# Patient Record
Sex: FEMALE | Race: WHITE
[De-identification: names, ages, dates, MRNs, and addresses within clinical notes are randomized per-mention and may not be internally consistent; named-entity substitution may affect disease eponyms.]

---

## 2021-02-25 ENCOUNTER — HOSPITAL ENCOUNTER (OUTPATIENT)
Dept: HOSPITAL 92 - ERS | Age: 86
Setting detail: OBSERVATION
LOS: 2 days | Discharge: HOME | End: 2021-02-27
Attending: FAMILY MEDICINE | Admitting: FAMILY MEDICINE
Payer: MEDICARE

## 2021-02-25 VITALS — BODY MASS INDEX: 27.3 KG/M2

## 2021-02-25 DIAGNOSIS — I10: ICD-10-CM

## 2021-02-25 DIAGNOSIS — Z88.2: ICD-10-CM

## 2021-02-25 DIAGNOSIS — H35.30: ICD-10-CM

## 2021-02-25 DIAGNOSIS — U07.1: Primary | ICD-10-CM

## 2021-02-25 DIAGNOSIS — F41.9: ICD-10-CM

## 2021-02-25 DIAGNOSIS — Z79.82: ICD-10-CM

## 2021-02-25 DIAGNOSIS — F32.9: ICD-10-CM

## 2021-02-25 DIAGNOSIS — Z86.73: ICD-10-CM

## 2021-02-25 DIAGNOSIS — R07.89: ICD-10-CM

## 2021-02-25 LAB
ALBUMIN SERPL BCG-MCNC: 3.7 G/DL (ref 3.4–4.8)
ALP SERPL-CCNC: 72 U/L (ref 40–110)
ALT SERPL W P-5'-P-CCNC: 17 U/L (ref 8–55)
ANION GAP SERPL CALC-SCNC: 12 MMOL/L (ref 10–20)
AST SERPL-CCNC: 18 U/L (ref 5–34)
BASOPHILS # BLD AUTO: 0.1 THOU/UL (ref 0–0.2)
BASOPHILS NFR BLD AUTO: 2.3 % (ref 0–1)
BILIRUB SERPL-MCNC: 0.2 MG/DL (ref 0.2–1.2)
BUN SERPL-MCNC: 17 MG/DL (ref 9.8–20.1)
CALCIUM SERPL-MCNC: 8.9 MG/DL (ref 7.8–10.44)
CHLORIDE SERPL-SCNC: 105 MMOL/L (ref 98–107)
CO2 SERPL-SCNC: 24 MMOL/L (ref 23–31)
CREAT CL PREDICTED SERPL C-G-VRATE: 0 ML/MIN (ref 70–130)
EOSINOPHIL # BLD AUTO: 0.1 THOU/UL (ref 0–0.7)
EOSINOPHIL NFR BLD AUTO: 2.2 % (ref 0–10)
GLOBULIN SER CALC-MCNC: 3 G/DL (ref 2.4–3.5)
GLUCOSE SERPL-MCNC: 118 MG/DL (ref 83–110)
HGB BLD-MCNC: 15.3 G/DL (ref 12–16)
LYMPHOCYTES # BLD: 1.7 THOU/UL (ref 1.2–3.4)
LYMPHOCYTES NFR BLD AUTO: 38.8 % (ref 21–51)
MCH RBC QN AUTO: 29.9 PG (ref 27–31)
MCV RBC AUTO: 90.1 FL (ref 78–98)
MONOCYTES # BLD AUTO: 0.4 THOU/UL (ref 0.11–0.59)
MONOCYTES NFR BLD AUTO: 9.9 % (ref 0–10)
NEUTROPHILS # BLD AUTO: 2.1 THOU/UL (ref 1.4–6.5)
NEUTROPHILS NFR BLD AUTO: 46.8 % (ref 42–75)
PLATELET # BLD AUTO: 184 THOU/UL (ref 130–400)
POTASSIUM SERPL-SCNC: 3.6 MMOL/L (ref 3.5–5.1)
RBC # BLD AUTO: 5.12 MILL/UL (ref 4.2–5.4)
SODIUM SERPL-SCNC: 137 MMOL/L (ref 136–145)
WBC # BLD AUTO: 4.4 THOU/UL (ref 4.8–10.8)

## 2021-02-25 PROCEDURE — 86900 BLOOD TYPING SEROLOGIC ABO: CPT

## 2021-02-25 PROCEDURE — 96372 THER/PROPH/DIAG INJ SC/IM: CPT

## 2021-02-25 PROCEDURE — 85379 FIBRIN DEGRADATION QUANT: CPT

## 2021-02-25 PROCEDURE — 86140 C-REACTIVE PROTEIN: CPT

## 2021-02-25 PROCEDURE — 86901 BLOOD TYPING SEROLOGIC RH(D): CPT

## 2021-02-25 PROCEDURE — 99285 EMERGENCY DEPT VISIT HI MDM: CPT

## 2021-02-25 PROCEDURE — 80053 COMPREHEN METABOLIC PANEL: CPT

## 2021-02-25 PROCEDURE — 36415 COLL VENOUS BLD VENIPUNCTURE: CPT

## 2021-02-25 PROCEDURE — 96374 THER/PROPH/DIAG INJ IV PUSH: CPT

## 2021-02-25 PROCEDURE — U0003 INFECTIOUS AGENT DETECTION BY NUCLEIC ACID (DNA OR RNA); SEVERE ACUTE RESPIRATORY SYNDROME CORONAVIRUS 2 (SARS-COV-2) (CORONAVIRUS DISEASE [COVID-19]), AMPLIFIED PROBE TECHNIQUE, MAKING USE OF HIGH THROUGHPUT TECHNOLOGIES AS DESCRIBED BY CMS-2020-01-R: HCPCS

## 2021-02-25 PROCEDURE — 85025 COMPLETE CBC W/AUTO DIFF WBC: CPT

## 2021-02-25 PROCEDURE — 71045 X-RAY EXAM CHEST 1 VIEW: CPT

## 2021-02-25 PROCEDURE — G0378 HOSPITAL OBSERVATION PER HR: HCPCS

## 2021-02-25 PROCEDURE — 86850 RBC ANTIBODY SCREEN: CPT

## 2021-02-25 PROCEDURE — 80048 BASIC METABOLIC PNL TOTAL CA: CPT

## 2021-02-25 PROCEDURE — 84484 ASSAY OF TROPONIN QUANT: CPT

## 2021-02-25 PROCEDURE — 87635 SARS-COV-2 COVID-19 AMP PRB: CPT

## 2021-02-25 PROCEDURE — U0005 INFEC AGEN DETEC AMPLI PROBE: HCPCS

## 2021-02-25 PROCEDURE — 93005 ELECTROCARDIOGRAM TRACING: CPT

## 2021-02-25 PROCEDURE — 83880 ASSAY OF NATRIURETIC PEPTIDE: CPT

## 2021-02-25 NOTE — RAD
SINGLE VIEW OF THE CHEST:

2/25/21

 

COMPARISON: 

3/5/18

 

HISTORY: 

Chest pressure. Positive COVID test. 

 

FINDINGS: 

Single view of the chest shows normal sized cardiomediastinal silhouette. Bilateral pulmonary vascula
r enlargement is stable. There is no evidence of consolidation, mass or pleural effusion. Air beneath
 the right hemidiaphragm is stable and represents air in the colon. 

 

IMPRESSION: 

No evidence of acute cardiopulmonary disease. 

 

POS: [facility]

## 2021-02-26 LAB — SARS-COV-2 RNA RESP QL NAA+PROBE: DETECTED

## 2021-02-26 RX ADMIN — ASPIRIN SCH MG: 81 TABLET ORAL at 08:14

## 2021-02-26 RX ADMIN — Medication SCH UNITS: at 08:14

## 2021-02-26 NOTE — HP
CHIEF COMPLAINT:  Chest pain with COVID positive status.



HISTORY OF PRESENT ILLNESS:  The patient is an 88-year-old female who had the onset

of a cold spell last week, was invited to stay with her daughter when her

electricity went out [place] the country.  It turns out the daughter became ill and

tested positive for COVID on Monday.  She encouraged her mother to go get tested,

who did, and was in fact positive as well.  On Tuesday, the patient went obtained

monoclonal antibody infusion.  She has had one of the two vaccinations previously

and was due to get her 2nd vaccine soon.  Over the Wednesday, her course was fairly

unremarkable, but then on Thursday, she began to have some chest pressure.  She

states it felt like someone sitting on her chest.  The pain did not go into her neck

or arms.  She was not nauseated.  She did feel short of breath.  There was no

diaphoresis.  She came to the emergency room for further evaluation.  There, initial

enzymes were negative but her EKG revealed T-wave inversions in some of her lateral

leads.  She was put in for further observation. 



PAST MEDICAL HISTORY:  Significant for hypertension, macular degeneration.  She has

had a stroke in the past.  She struggled with some depression since losing her

. 



PAST SURGICAL HISTORY:  Hernia repair in March 2018.  She has had abdominal

resection and cholecystectomy. 



PSYCHIATRIC HISTORY:  Significant for the aforementioned depression and anxiety and

grief. 



SOCIAL HISTORY:  [place].  Has been in fairly good health

without any use of alcohol or drugs.  She has never smoked. 



ALLERGIES:  SULFA.



CURRENT MEDICATIONS:  On admission include:

1. Hydrochlorothiazide 25 mg q.a.m.

2. Aspirin 81 mg daily.

3. Vitamin D 400 IU daily.

4. Mirtazapine 30 mg daily.



REVIEW OF SYSTEMS:  CONSTITUTIONAL:  She feels fine at this time.  Denies weakness,

chills, fever, malaise. 

HEENT:  Denies drainage or ulcerations of her head, eyes, nose, throat. 

CHEST:  Admits to mild shortness of breath but no coughing. 

CARDIOVASCULAR:  Admits to the chest pressure like someone sitting on her chest, but

denies palpitations. 

GI:  She did have some diarrhea on the day of admission, but no vomiting.  No

nausea. 

:  Denies blood in urine or stool or dysuria. 

MUSCULOSKELETAL:  Denies general muscle weakness, aches or pains. 

SKIN:  No new rashes or lesions. 

NEUROLOGIC:  Denies headaches, blurred vision, trouble with mentation.



PHYSICAL EXAMINATION:

VITAL SIGNS:  Blood pressure on admission was 152/81, pulse 81, respirations 18,

temperature 98.3, pain 0/10, and 100% O2 saturation on room air. 

GENERAL:  This is a well-developed, well-nourished  elderly female, alert,

oriented, cooperative. 

HEENT:  Normocephalic, atraumatic.  Arcus senilis bilaterally.  Pupils are equal,

round, and reactive to light.  Sclera nonicteric and clear.  Extraocular muscles

intact.  TMs, nares, and pharynx clear.  Membranes moist. 

NECK:  Supple.  Trachea midline.  No masses. 

CHEST:  Good breath sounds bilaterally. 

BREASTS:  Deferred. 

HEART:  Regular rate and rhythm without murmur. 

ABDOMEN:  Soft, nontender without organomegaly. 

:  Deferred. 

EXTREMITIES:  Without clubbing, cyanosis, or edema.  Normal range of motion present.

 Normal muscular tone development with good range of motion upper and lower

extremities. 

SKIN:  No new rashes or lesions. 

NEUROLOGIC:  Cranial nerves intact.  Gait and cerebellar function intact.  Sensory

exam is intact.  Mental status is at baseline, clear. 



LABORATORY DATA:  On admission thus far shows WBCs 4.4, hemoglobin 15.3, hematocrit

46.1 with platelets at 184.  Sodium 137, potassium 3.6, chloride 105, CO2 of 24, BUN

17, creatinine 0.9 with a GFR 57, glucose 118.  Liver functions unremarkable.

Troponin I is negative x3.  BNP at 34.  Liver functions normal.  Serology positive

for COVID.  Chest x-ray shows no evidence of cardiopulmonary disease. 



ASSESSMENT:  

1. Chest pain--EKG changes.

2. Coronavirus disease positive status.

3. Hypertension.

4. Mild anxiety.



PLAN:  Cardiology consultation since I am unable to obtain stress test due to her

COVID status.  We will also get her some convalescent plasma and if she qualifies

for remdesivir, we will use that as well.  She will be provided with anticoagulation

and anti-inflammatory treatment with Decadron and colchicine and serially

re-evaluate the patient.  Monitor O2 saturations as well as recurrence of chest

pain. 







Job ID:  [id number]

## 2021-02-26 NOTE — CON
DATE OF CONSULTATION:  



REASON FOR CONSULTATION:  Chest pain.



REFERRING PROVIDER:  Dr. Jagdeep Arriola.



HISTORY OF PRESENT ILLNESS:  Ms. Bella is a pleasant 88-year-old woman who I have not

seen her about in the past.  She recently presented with chest pain.  Her pain was

described as mild to moderate in nature, lasting for 20 minutes.  She has not had

any recurrent episodes.  She has no previous history of underlying coronary artery

disease. 



Subsequently, Ms Bella has also been diagnosed with COVID.  She has been treated with

antibodies.  She has not had fevers or chills, but has been exposed to family

members with COVID. 



PAST MEDICAL HISTORY:  

1. Hypertension.

2. Macular degeneration.

3. Previous CVA.

4. Hernia repair.

5. Cholecystectomy.



SOCIAL HISTORY:  No current tobacco or alcohol use.



ALLERGIES:  SULFA.



HOME MEDICATIONS:  Include:

1. Aspirin.

2. Vitamin D.

3. Hydrochlorothiazide.



REVIEW OF SYSTEMS:  A 10-point review of systems is reviewed and is as above,

otherwise negative. 



PHYSICAL EXAMINATION:

GENERAL:  Patient is a pleasant 88-year-old woman who is in no acute distress.  The

patient appears their stated age. 

VITAL SIGNS:  Blood pressure 137/72, pulse 69, temperature 98.8. 

NEUROLOGIC:  The patient is alert and oriented x3 with no focal neurologic deficits. 

HEENT:  Sclerae without icterus.  Mouth has moist mucous membranes with normal

pallor. 

NECK:  No JVD.  Carotid upstroke brisk.  No bruits bilaterally. 

LUNGS:  Clear to auscultation with unlabored respirations. 

BACK:  No scoliosis or kyphosis. 

CARDIAC:  Regular rate and rhythm with normal S1 and S2.  No S3 or S4 noted.  No

significant rubs, murmurs, thrills, or gallops noted throughout the precordium.  PMI

is not displaced.  There is no parasternal heave. 

ABDOMEN:  Soft, nontender, nondistended.  No peritoneal signs present.  No

hepatosplenomegaly.  No abnormal striae. 

EXTREMITIES:  2+ femoral and 2+ dorsalis pedis pulses.  No cyanosis, clubbing, or

edema. 

SKIN:  No gross abnormalities.



PERTINENT LABORATORY DATA:  Hemoglobin 15.3 and hematocrit 46.1. 



Troponin negative. 



EKG shows normal sinus rhythm with inverted T-waves noted anterolaterally.  No

previous EKG for comparison. 



IMPRESSION:  

1. Chest pain.

2. Abnormal EKG.



RECOMMENDATIONS:  I am pleased Ms. Bella' troponin negative.  She has had one episode

of chest pain, no recurrence. 



She is COVID positive and undergoing treatment.  Given that she is stable, the plan

would be to continue medical therapy.  We will make adjustments.  We will recommend

aspirin in addition to statin therapy.  We will also consider Imdur and Plavix. 



If the patient remains pain free, it would be okay from my standpoint to discharge

home to recover from COVID.  My plan would be outpatient noninvasive stress study.

If has recurrent episodes of the pain, we may need to proceed with coronary

angiography. 







Job ID:  [id number]

## 2021-02-26 NOTE — RAD
CHEST 1 VIEW:

 

HISTORY: 

Chest pain.

 

COMPARISON: 

Radiograph 2/25/2021.

 

FINDINGS: 

Continued interposition of bowel to the right hemidiaphragm and the liver.  Compressive atelectasis r
ight lung base.  No pneumothorax.  There is some atelectasis in the right lung base.  No pneumothorax
.

 

No confluent airspace consolidation.

 

IMPRESSION: 

Similar examination of the chest.

 

POS: [facility]

## 2021-02-27 VITALS — SYSTOLIC BLOOD PRESSURE: 134 MMHG | TEMPERATURE: 98.1 F | DIASTOLIC BLOOD PRESSURE: 60 MMHG

## 2021-02-27 LAB
ANION GAP SERPL CALC-SCNC: 17 MMOL/L (ref 10–20)
BASOPHILS # BLD AUTO: 0 THOU/UL (ref 0–0.2)
BASOPHILS NFR BLD AUTO: 0.5 % (ref 0–1)
BUN SERPL-MCNC: 19 MG/DL (ref 9.8–20.1)
CALCIUM SERPL-MCNC: 9.2 MG/DL (ref 7.8–10.44)
CHLORIDE SERPL-SCNC: 103 MMOL/L (ref 98–107)
CO2 SERPL-SCNC: 21 MMOL/L (ref 23–31)
CREAT CL PREDICTED SERPL C-G-VRATE: 54 ML/MIN (ref 70–130)
CRP SERPL-MCNC: (no result) MG/DL
EOSINOPHIL # BLD AUTO: 0 THOU/UL (ref 0–0.7)
EOSINOPHIL NFR BLD AUTO: 0.2 % (ref 0–10)
GLUCOSE SERPL-MCNC: 159 MG/DL (ref 83–110)
HGB BLD-MCNC: 15 G/DL (ref 12–16)
LYMPHOCYTES # BLD: 1.9 THOU/UL (ref 1.2–3.4)
LYMPHOCYTES NFR BLD AUTO: 23.2 % (ref 21–51)
MCH RBC QN AUTO: 30.4 PG (ref 27–31)
MCV RBC AUTO: 91 FL (ref 78–98)
MONOCYTES # BLD AUTO: 0.5 THOU/UL (ref 0.11–0.59)
MONOCYTES NFR BLD AUTO: 6.3 % (ref 0–10)
NEUTROPHILS # BLD AUTO: 5.7 THOU/UL (ref 1.4–6.5)
NEUTROPHILS NFR BLD AUTO: 69.8 % (ref 42–75)
PLATELET # BLD AUTO: 168 THOU/UL (ref 130–400)
POTASSIUM SERPL-SCNC: 3 MMOL/L (ref 3.5–5.1)
RBC # BLD AUTO: 4.95 MILL/UL (ref 4.2–5.4)
SODIUM SERPL-SCNC: 138 MMOL/L (ref 136–145)
WBC # BLD AUTO: 8.1 THOU/UL (ref 4.8–10.8)

## 2021-02-27 RX ADMIN — Medication SCH UNITS: at 08:53

## 2021-02-27 RX ADMIN — ASPIRIN SCH MG: 81 TABLET ORAL at 08:54
